# Patient Record
Sex: FEMALE | Race: WHITE | Employment: OTHER | URBAN - METROPOLITAN AREA
[De-identification: names, ages, dates, MRNs, and addresses within clinical notes are randomized per-mention and may not be internally consistent; named-entity substitution may affect disease eponyms.]

---

## 2017-06-16 ENCOUNTER — HOSPITAL ENCOUNTER (OUTPATIENT)
Dept: ULTRASOUND IMAGING | Age: 57
Discharge: HOME | End: 2017-06-16
Attending: INTERNAL MEDICINE
Payer: COMMERCIAL

## 2017-06-16 DIAGNOSIS — E04.9 GOITER: ICD-10-CM

## 2017-06-16 PROCEDURE — 76536 US EXAM OF HEAD AND NECK: CPT

## 2017-11-28 ENCOUNTER — HOSPITAL ENCOUNTER (OUTPATIENT)
Dept: GENERAL RADIOLOGY | Age: 57
Discharge: HOME | End: 2017-11-28
Attending: PHYSICIAN ASSISTANT
Payer: COMMERCIAL

## 2017-11-28 DIAGNOSIS — R13.10 PROBLEMS WITH SWALLOWING AND MASTICATION: ICD-10-CM

## 2017-11-28 PROCEDURE — 74246 X-RAY XM UPR GI TRC 2CNTRST: CPT

## 2017-11-28 PROCEDURE — 74011000250 HC RX REV CODE- 250

## 2017-11-28 PROCEDURE — 74011000255 HC RX REV CODE- 255

## 2017-11-28 RX ADMIN — BARIUM SULFATE 135 ML: 980 POWDER, FOR SUSPENSION ORAL at 11:00

## 2017-11-28 RX ADMIN — ANTACID/ANTIFLATULENT 4 G: 380; 550; 10; 10 GRANULE, EFFERVESCENT ORAL at 11:00

## 2017-11-28 RX ADMIN — BARIUM SULFATE 176 G: 960 POWDER, FOR SUSPENSION ORAL at 11:00

## 2018-01-02 ENCOUNTER — HOSPITAL ENCOUNTER (OUTPATIENT)
Dept: MAMMOGRAPHY | Age: 58
Discharge: HOME | End: 2018-01-02
Attending: OBSTETRICS & GYNECOLOGY
Payer: COMMERCIAL

## 2018-01-02 DIAGNOSIS — Z12.31 VISIT FOR SCREENING MAMMOGRAM: ICD-10-CM

## 2018-01-02 PROCEDURE — 77063 BREAST TOMOSYNTHESIS BI: CPT

## 2018-02-07 ENCOUNTER — HOSPITAL ENCOUNTER (OUTPATIENT)
Dept: LAB | Age: 58
Discharge: HOME | End: 2018-02-07
Payer: OTHER GOVERNMENT

## 2018-02-07 LAB
BACTERIA SPEC CULT: NORMAL
SERVICE CMNT-IMP: NORMAL

## 2018-02-07 PROCEDURE — 87186 SC STD MICRODIL/AGAR DIL: CPT | Performed by: PHYSICIAN ASSISTANT

## 2018-02-07 PROCEDURE — 87046 STOOL CULTR AEROBIC BACT EA: CPT | Performed by: PHYSICIAN ASSISTANT

## 2018-02-07 PROCEDURE — 87177 OVA AND PARASITES SMEARS: CPT | Performed by: PHYSICIAN ASSISTANT

## 2018-02-07 PROCEDURE — 87493 C DIFF AMPLIFIED PROBE: CPT | Performed by: PHYSICIAN ASSISTANT

## 2018-02-09 LAB
G LAMBLIA AG STL QL IA: NEGATIVE
O+P STL CONC: NORMAL
SPECIMEN SOURCE: NORMAL

## 2018-02-10 LAB
BACTERIA SPEC CULT: NORMAL
SERVICE CMNT-IMP: NORMAL

## 2018-02-11 LAB
O&P RESULT 1, 080877: NORMAL
SOURCE, RSRC56: NORMAL

## 2018-10-01 ENCOUNTER — OFFICE VISIT (OUTPATIENT)
Dept: NEUROLOGY | Age: 58
End: 2018-10-01

## 2018-10-01 VITALS
TEMPERATURE: 96.7 F | SYSTOLIC BLOOD PRESSURE: 120 MMHG | DIASTOLIC BLOOD PRESSURE: 70 MMHG | RESPIRATION RATE: 16 BRPM | WEIGHT: 255.38 LBS | BODY MASS INDEX: 45.25 KG/M2 | HEART RATE: 79 BPM | HEIGHT: 63 IN

## 2018-10-01 DIAGNOSIS — M48.061 SPINAL STENOSIS OF LUMBAR REGION WITHOUT NEUROGENIC CLAUDICATION: ICD-10-CM

## 2018-10-01 DIAGNOSIS — M48.02 CERVICAL STENOSIS OF SPINE: Primary | ICD-10-CM

## 2018-10-01 PROBLEM — E66.01 OBESITY, MORBID (HCC): Status: ACTIVE | Noted: 2018-10-01

## 2018-10-01 RX ORDER — CHOLECALCIFEROL (VITAMIN D3) 125 MCG
CAPSULE ORAL
COMMUNITY

## 2018-10-01 NOTE — PROGRESS NOTES
[address], [address], [place], [address]  [facility] 177. Simone Segura, [address]  Office:  [phone]  Fax: [phone]    Referring: Dr. Burgess Lake    Chief Complaint   Patient presents with    Neck Pain     spinal stenosis       HPI:  Katerine Flynn is a 62 year female referred for spinal stenosis. She has a history of cervical spinal stenosis and had been under the care of neurosurgeon Dr. Isar Benton. She has seen him last a year or two ago. She has had right knee replacement at [facility] two years ago. She has been having increased posterior neck pain. There is an associated jarring tap-like sensation that radiates to her head that she has been experiencing for about two weeks. No other radiating pain, hand weakness, gait abnormalities, or loss of bowel or bladder control. Reports carpal tunnel in left arm- sees Dr. Jeni Kemp hand specialist. Had burning pain left hand, had steroid injection. Drops things in left hand she believes when arthritis is bothering her. She had last c-spine MRI in 2015 which report was reviewed in [facility], also MRI lumbar spine last done at 2014. Reports she had MRI c-spine at [facility] in 2016 or so but cannot see in Care Everywhere. There is an EMG scanned into the system from May 2014 that indicates a mild left ulnar neuropathy at the elbow and an underlying axonal sensory polyneuropathy (absent sural responses main finding of polyneuropathy). Does not use assistive device. Recovering from right knee replacement. Lower back being more of a problem within the past year. She reports having a recent sinus infection and bronchitis and being placed on antibiotics. She drove a school bus for 21 years.      Social History     Social History    Marital status:      Spouse name: N/A    Number of children: N/A    Years of education: N/A     Occupational History          Social History Main Topics    Smoking status: Former Smoker     Packs/day: 1.00    Smokeless tobacco: Never Used      Comment: quit 8/2016    Alcohol use No    Drug use: No    Sexual activity: Not on file     Other Topics Concern    Not on file     Social History Narrative       Family History   Problem Relation Age of Onset    Cancer Father      lung    Heart Disease Father     Asthma Mother     Osteoporosis Mother     Breast Cancer Paternal Grandmother        Current Outpatient Prescriptions   Medication Sig Dispense Refill    cyclobenzaprine HCl (FLEXERIL PO) Take 7.5 mg by mouth nightly as needed.  naproxen sodium (ALEVE) 220 mg cap Take  by mouth.  raNITIdine (ZANTAC) 150 mg tablet Take 150 mg by mouth two (2) times a day.  gabapentin (NEURONTIN) 300 mg capsule Take 600 mg by mouth nightly.  aspirin delayed-release 81 mg tablet Take 81 mg by mouth daily.  triamcinolone (NASACORT AQ) 55 mcg nasal inhaler 2 Sprays by Both Nostrils route daily. 1 Bottle 1    triamterene-hydrochlorothiazide (DYAZIDE) 37.5-25 mg per capsule Take 1 Cap by mouth daily as needed. 45 Cap 1    ergocalciferol (ERGOCALCIFEROL) 50,000 unit capsule Take 1 Cap by mouth daily. 90 Cap 3    cyanocobalamin (VITAMIN B-12) 1,000 mcg tablet Take 1,000 mcg by mouth two (2) times a week.          Past Medical History:   Diagnosis Date    Allergic rhinitis     Anxiety     situational difficulty    Back pain     Bursitis of shoulder, left     CAD (coronary artery disease) 03/22/2017    2 stents placed    DJD (degenerative joint disease)     Fibromyalgia     GERD (gastroesophageal reflux disease)     Headache(784.0)     related to DDD of the neck    Hypercholesterolemia     Nausea & vomiting     Obesity     Osteomalacia     Overactive bladder     PN (peripheral neuropathy)     Prediabetes     Seizures ([facility] [address]     one time episode 12/2016 (unknown reason why, no medications, no recurrence)    Shoulder pain, left Past Surgical History:   Procedure Laterality Date    HX CORONARY STENT PLACEMENT  03/22/2017    2 stents placed    HX HEENT      tonsillectomy    HX HYSTERECTOMY      HX KNEE REPLACEMENT Right 08/2016    HX OOPHORECTOMY         Allergies   Allergen Reactions    Augmentin [Amoxicillin-Pot Clavulanate] Diarrhea    Codeine Nausea and Vomiting    Pcn [Penicillins] Hives    Pravastatin Myalgia    Shellfish Derived Itching    Sulfa (Sulfonamide Antibiotics) Hives    Zocor [Simvastatin] Myalgia       Patient Active Problem List   Diagnosis Code    Fibromyalgia M79.7    Obesity E66.9    PN (peripheral neuropathy) G62.9    Overactive bladder N32.81    Blood glucose elevated R73.9    Chronic venous insufficiency I87.2    Tobacco abuse Z72.0    Hyperlipidemia E78.5    Unspecified vitamin D deficiency E55.9    Hand pain, left M79.642    Bronchitis J40    Lumbar spondylosis M47.816    Other B-complex deficiencies E53.8    Obesity, morbid (HCC) E66.01         Review of Systems:   Constitutional: no fever or chills  Skin denies rash or itching  HEENT:  Denies tinnitus, hearing loss, or visual changes  Respiratory: denies shortness of breath  Cardiovascular: denies chest pain, dyspnea on exertion  Gastrointestinal: does not report nausea or vomiting  Genitourinary: does not report dysuria or incontinence  Musculoskeletal: right knee joint pain and swelling. Neck pain, lower back pain. Endocrine: denies weight change  Hematology: denies easy bruising or bleeding   Neurological: as above in HPI      PHYSICAL EXAMINATION:      VITAL SIGNS:    Visit Vitals    /70 (BP 1 Location: Right arm, BP Patient Position: Sitting)    Pulse 79    Temp 96.7 °F (35.9 °C) (Oral)    Resp 16    Ht 5' 3\" (1.6 m)    Wt 115.8 kg (255 lb 6 oz)    BMI 45.24 kg/m2       GENERAL: Well developed, well nourished, in no apparent distress. HEART: RR, no murmurs heard, no carotid bruits.   LUNGS: CTAB.  EXTREMITIES: No clubbing, cyanosis, or edema is identified. Pulses 2+    and symmetrical.  HEAD:   Normocephalic, atraumatic. NEUROLOGIC EXAMINATION    MENTAL STATUS: Awake, alert, and oriented x 4. Attention and STM are grossly normal. There is no aphasia. Fund of knowledge is adequate. Mood and affect are appropriate. CRANIAL NERVES: Visual fields are full to confrontation. Pupils are reactive to light and accommodation. Extraocular movements are intact and there is no nystagmus. Facial sensation is normal.  Face is symmetrical.   Hearing is grossly intact. SCM/TPZ 5/5. Palate rises symmetrically. Tongue is in the midline. MOTOR:  Normal tone, bulk, and strength, 5/5 muscle strength throughout. No cogwheel rigidity or clonus present. CEREBELLAR: Finger to nose was normal.   No tremors or dysmetria. SENSORY: Normal LT    DTRs: Increased LUE compared to RUE. Diminished LE. GAIT:   Normal gait. Impression/Plan:   Jose Angel Villela is a 62 y.o. female with cervical spinal stenosis. We will obtain imaging of her cervical spine and lumbar spine with MRI to assess for changes in spinal stenosis due her recent increase in symptoms and follow up with her thereafter. In the meantime she continues on her current regimen of naproxen, Flexeril, and gabapentin. Diagnoses and all orders for this visit:    1. Cervical stenosis of spine  -     MRI CERV SPINE WO CONT; Future  -     MRI LUMB SPINE WO CONT; Future    2. Spinal stenosis of lumbar region without neurogenic claudication  -     MRI CERV SPINE WO CONT; Future  -     MRI LUMB SPINE WO CONT; Future      This note will not be viewable in MyChart. Signed By: Tony Mace NP      I have reviewed the documentation provided by the nurse practitioner, Nani Penny Kowalski, and we have discussed her findings and the clinical impression. I have formulated with her the proposed management plans for this patient.   Additionally, I have personally evaluated the patient to verify the history and to confirm physical findings. Below are my additional comments:  59-year-old lady with known cervical stenosis and lumbar stenosis with increasing pain about the neck and some burning dysesthesia in the left upper extremity it was previously attributed as carpal tunnel. She has not had any recent imaging. She has not had any recent fall. Not dropping things. She does note that she is having increasing pain in the low back as well. She walks for distances her legs seem to get weak. No fall. No focal dragging of the leg. Her examination today is completely nonfocal with a normal general physical examination as well. No pathologic reflexes today. At this juncture given her known disease and increase in symptoms we will update imaging with an MRI of the cervical and lumbar spine without contrast looking for evidence of increased disease progression. She will return at the conclusion of her testing and at that point depending on the anatomy we may refer her back to neurosurgery versus physical therapy versus seeing Dr. Ben Canales for injections. Given the fact that her examination is intact I would favor the physical therapy and injection route but again let see what the pictures show us. In any regard we would not institute any treatment plan until we have updated anatomy. Deni Rios MD   This note will not be viewable in [address] PLEASE NOTE:   Portions of this document may have been produced using voice recognition software. Unrecognized errors in transcription may be present.

## 2018-10-01 NOTE — PROGRESS NOTES
Patient is here today to see  for their h/o spinal stenosis.     PHQ over the last two weeks 10/1/2018   Little interest or pleasure in doing things Several days   Feeling down, depressed, irritable, or hopeless Several days   Total Score PHQ 2 2

## 2018-10-01 NOTE — MR AVS SNAPSHOT
[address] [address] [address] [facility] 
[phone] Patient: Leni Hilario MRN: [id number] EDGAR:[id number] Visit Information Date & Time Provider Department Dept. Phone Encounter #  
 10/1/2018  3:00 PM Carl Michelle MD [facility] at [address] [place] [address] [id number] Follow-up Instructions Return for After tests NP. Upcoming Health Maintenance Date Due Hepatitis C Screening 1960 Shingrix Vaccine Age 50> (1 of 2) 1/22/2010 FOBT Q 1 YEAR AGE 50-75 1/22/2010 PAP AKA CERVICAL CYTOLOGY 9/11/2016 Influenza Age 5 to Adult 8/1/2018 BREAST CANCER SCRN MAMMOGRAM 1/2/2020 DTaP/Tdap/Td series (2 - Td) 10/1/2021 Allergies as of 10/1/2018  Review Complete On: 10/1/2018 By: Ashley Sauceda NP Severity Noted Reaction Type Reactions Augmentin [Amoxicillin-pot Clavulanate]  08/10/2011    Diarrhea Codeine    Nausea and Vomiting Pcn [Penicillins]  12/02/2016    Hives Pravastatin  11/11/2011    Myalgia Shellfish Derived  08/03/2017    Itching Sulfa (Sulfonamide Antibiotics)  12/02/2016    Hives Zocor [Simvastatin]   Intolerance Myalgia Current Immunizations  Reviewed on 12/23/2013 Name Date Influenza Vaccine PF 12/23/2013 TD Vaccine 1/1/2005 Tdap 10/1/2011 Zoster Vaccine, Live 3/27/2014 Not reviewed this visit You Were Diagnosed With   
  
 Codes Comments Cervical stenosis of spine    -  Primary ICD-10-CM: M48.02 
ICD-9-CM: 723.0 Spinal stenosis of lumbar region without neurogenic claudication     ICD-10-CM: M48.061 
ICD-9-CM: 724.02 Vitals BP Pulse Temp Resp Height(growth percentile) Weight(growth percentile) 120/70 (BP 1 Location: Right arm, BP Patient Position: Sitting) 79 96.7 °F (35.9 °C) (Oral) 16 5' 3\" (1.6 m) 255 lb 6 oz (115.8 kg) BMI OB Status Smoking Status 45.24 kg/m2 Hysterectomy Former Smoker Vitals History BMI and BSA Data Body Mass Index Body Surface Area  
 45.24 kg/m 2 2.27 m 2 Your Updated Medication List  
  
   
This list is accurate as of 10/1/18  4:47 PM.  Always use your most recent med list.  
  
  
  
  
 ALEVE 220 mg Cap Generic drug:  naproxen sodium Take  by mouth. aspirin delayed-release 81 mg tablet Take 81 mg by mouth daily. ergocalciferol 50,000 unit capsule Commonly known as:  ERGOCALCIFEROL Take 1 Cap by mouth daily. FLEXERIL PO Take 7.5 mg by mouth nightly as needed. gabapentin 300 mg capsule Commonly known as:  NEURONTIN Take 600 mg by mouth nightly. triamcinolone 55 mcg nasal inhaler Commonly known as:  NASACORT AQ  
2 Sprays by Both Nostrils route daily. triamterene-hydroCHLOROthiazide 37.5-25 mg per capsule Commonly known as:  Mendoza Guerrero Take 1 Cap by mouth daily as needed. VITAMIN B-12 1,000 mcg tablet Generic drug:  cyanocobalamin Take 1,000 mcg by mouth two (2) times a week. ZANTAC 150 mg tablet Generic drug:  raNITIdine Take 150 mg by mouth two (2) times a day. Follow-up Instructions Return for After tests NP. To-Do List   
 10/01/2018 Imaging:  MRI CERV SPINE WO CONT   
  
 10/01/2018 Imaging:  MRI LUMB SPINE WO CONT Introducing [facility]! [facility] introduces [employer] patient portal. Now you can access parts of your medical record, email your doctor's office, and request medication refills online. 1. In your internet browser, go to [email or web address] 2. Click on the First Time User? Click Here link in the Sign In box. You will see the New Member Sign Up page. 3. Enter your [employer] Access Code exactly as it appears below. You will not need to use this code after youve completed the sign-up process.  If you do not sign up before the expiration date, you must request a new code. · [email or web address] Access Code: [email or web address] Expires: 12/30/2018  3:00 PM 
 
4. Enter the last four digits of your Social Security Number (xxxx) and Date of Birth (mm/dd/yyyy) as indicated and click Submit. You will be taken to the next sign-up page. 5. Create a [employer] ID. This will be your [employer] login ID and cannot be changed, so think of one that is secure and easy to remember. 6. Create a [employer] password. You can change your password at any time. 7. Enter your Password Reset Question and Answer. This can be used at a later time if you forget your password. 8. Enter your e-mail address. You will receive e-mail notification when new information is available in [address] 9. Click Sign Up. You can now view and download portions of your medical record. 10. Click the Download Summary menu link to download a portable copy of your medical information. If you have questions, please visit the Frequently Asked Questions section of the [employer] website. Remember, [employer] is NOT to be used for urgent needs. For medical emergencies, dial 911. Now available from your iPhone and Android! Please provide this summary of care documentation to your next provider. Your primary care clinician is listed as [facility]. If you have any questions after today's visit, please call [phone].

## 2018-10-11 ENCOUNTER — HOSPITAL ENCOUNTER (OUTPATIENT)
Age: 58
Discharge: HOME | End: 2018-10-11
Payer: OTHER GOVERNMENT

## 2018-10-11 DIAGNOSIS — M48.061 SPINAL STENOSIS OF LUMBAR REGION WITHOUT NEUROGENIC CLAUDICATION: ICD-10-CM

## 2018-10-11 DIAGNOSIS — M48.02 CERVICAL STENOSIS OF SPINE: ICD-10-CM

## 2018-10-11 PROCEDURE — 72148 MRI LUMBAR SPINE W/O DYE: CPT

## 2018-10-11 PROCEDURE — 72141 MRI NECK SPINE W/O DYE: CPT

## 2018-10-15 ENCOUNTER — OFFICE VISIT (OUTPATIENT)
Dept: NEUROLOGY | Age: 58
End: 2018-10-15

## 2018-10-15 VITALS
HEIGHT: 63 IN | RESPIRATION RATE: 18 BRPM | TEMPERATURE: 97 F | SYSTOLIC BLOOD PRESSURE: 110 MMHG | DIASTOLIC BLOOD PRESSURE: 70 MMHG | WEIGHT: 256 LBS | HEART RATE: 78 BPM | BODY MASS INDEX: 45.36 KG/M2 | OXYGEN SATURATION: 98 %

## 2018-10-15 DIAGNOSIS — M48.02 CERVICAL STENOSIS OF SPINAL CANAL: Primary | ICD-10-CM

## 2018-10-15 NOTE — PROGRESS NOTES
Chief Complaint   Patient presents with    Back Pain     Follow up MRI     PHQ over the last two weeks 10/15/2018   Little interest or pleasure in doing things Not at all   Feeling down, depressed, irritable, or hopeless Not at all   Total Score PHQ 2 0

## 2018-10-15 NOTE — PROGRESS NOTES
[address], [address], [place], [address]  [address] Simone eSgura, [address]  Office:  [phone]  Fax: [phone]  Chief Complaint   Patient presents with    Back Pain     Follow up MRI     This is a 59-year-old female who presents for follow-up back pain. Pain to the neck. In the interim had her MRI is completed. Here for results. No new complaints at this time. Denies numbness, tingling, or passing out. Denies weakness. Denies dropping things. In the past she was seen by neurosurgeon Dr. Natalie Mosquera. She says due to insurance is unable to follow up with him. Past Medical History:   Diagnosis Date    Allergic rhinitis     Anxiety     situational difficulty    Back pain     Bursitis of shoulder, left     CAD (coronary artery disease) 03/22/2017    2 stents placed    DJD (degenerative joint disease)     Fibromyalgia     GERD (gastroesophageal reflux disease)     Headache(784.0)     related to DDD of the neck    Hypercholesterolemia     Nausea & vomiting     Obesity     Osteomalacia     Overactive bladder     PN (peripheral neuropathy)     Prediabetes     Seizures ([facility] [address]     one time episode 12/2016 (unknown reason why, no medications, no recurrence)    Shoulder pain, left        Past Surgical History:   Procedure Laterality Date    HX CORONARY STENT PLACEMENT  03/22/2017    2 stents placed    HX HEENT      tonsillectomy    HX HYSTERECTOMY      HX KNEE REPLACEMENT Right 08/2016    HX OOPHORECTOMY         Current Outpatient Prescriptions   Medication Sig Dispense Refill    cyclobenzaprine HCl (FLEXERIL PO) Take 7.5 mg by mouth nightly as needed.  naproxen sodium (ALEVE) 220 mg cap Take  by mouth.  raNITIdine (ZANTAC) 150 mg tablet Take 150 mg by mouth two (2) times a day.  gabapentin (NEURONTIN) 300 mg capsule Take 600 mg by mouth nightly.  aspirin delayed-release 81 mg tablet Take 81 mg by mouth daily.       triamcinolone (NASACORT AQ) 55 mcg nasal inhaler 2 Sprays by Both Nostrils route daily. 1 Bottle 1    triamterene-hydrochlorothiazide (DYAZIDE) 37.5-25 mg per capsule Take 1 Cap by mouth daily as needed. 45 Cap 1    ergocalciferol (ERGOCALCIFEROL) 50,000 unit capsule Take 1 Cap by mouth daily. 90 Cap 3    cyanocobalamin (VITAMIN B-12) 1,000 mcg tablet Take 1,000 mcg by mouth two (2) times a week. Allergies   Allergen Reactions    Augmentin [Amoxicillin-Pot Clavulanate] Diarrhea    Codeine Nausea and Vomiting    Pcn [Penicillins] Hives    Pravastatin Myalgia    Shellfish Derived Itching    Sulfa (Sulfonamide Antibiotics) Hives    Zocor [Simvastatin] Myalgia       Social History   Substance Use Topics    Smoking status: Former Smoker     Packs/day: 1.00    Smokeless tobacco: Never Used      Comment: quit 8/2016    Alcohol use No       Family History   Problem Relation Age of Onset    Cancer Father      lung    Heart Disease Father     Asthma Mother     Osteoporosis Mother     Breast Cancer Paternal Grandmother        Review of Systems  GENERAL: Denies fever or fatigue  CARDIAC: No CP or SOB  PULMONARY: No cough of SOB  MUSCULOSKELETAL: No new joint pain  NEURO: SEE HPI    Examination  Visit Vitals    /70 (BP 1 Location: Left arm, BP Patient Position: Sitting)    Pulse 78    Temp 97 °F (36.1 °C)    Resp 18    Ht 5' 3\" (1.6 m)    Wt 116.1 kg (256 lb)    SpO2 98%    BMI 45.35 kg/m2     This is a very pleasant 80-year-old female. She is alert and oriented x3. Her speech is clear. No abnormal movements. No signs of ataxia or incoordination. She ambulates steady. Impression/Plan  Alexx Amaro is a 62 y.o. female whose history and physical are consistent with cervical spinal stenosis. Patient presents for follow-up results.   We discussed diagnostic test results including MRI of the cervical spine with findings of interval progression of multilevel moderately advanced degenerative changes in the lower cervical spine. Severe central canal stenosis C4/5 and C5/6 and moderate to severe stenosis at C6/7 with associated cord compression. No myelopathy. Multilevel mild or moderate degrees of foraminal stenosis at C5/6 and C6/7. Changes noted from previous MRI of the C spine from 6/2015. At this juncture referral placed to neurosurgeon for further evaluation. We discussed MRI of the lumbar spine with degenerative findings with interval progression of multilevel listhesis and degenerative changes throughout the lumbar spine. No high grade stenosis found. We discussed following up on an as needed basis. All questions addressed and patient is agreeable with plan of care. Diagnoses and all orders for this visit:    1. Cervical stenosis of spinal canal  -     REFERRAL TO NEUROSURGERY    Total time: 25 min   Counseling / coordination time: 17 min   > 50% counseling / coordination?: Yes re: symptoms, management, diagnostic test results, coordination, answering questions, and plan of care. Signed By: Gurpreet Meier NP    This note will not be viewable in [address] PLEASE NOTE:   Portions of this document may have been produced using voice recognition software. Unrecognized errors in transcription may be present.

## 2018-10-15 NOTE — PATIENT INSTRUCTIONS
Thank you for choosing [employer] and [facility] for your     care. You may receive a survey about your visit. We appreciate you taking time     to complete this survey as we use your feedback to improve our services. We     realize we are not perfect, but we strive to provide excellent care.

## 2018-10-15 NOTE — MR AVS SNAPSHOT
[address] [address] [address] [facility] 
[phone] Patient: Herman Page MRN: [id number] [facility]:[birth year] Visit Information Date & Time Provider Department Dept. Phone Encounter #  
 10/15/2018 10:45 AM Carmelo Ellison NP [address] at [address] [place] [address] [id number] Follow-up Instructions Return if symptoms worsen or fail to improve. Upcoming Health Maintenance Date Due Hepatitis C Screening 1960 Shingrix Vaccine Age 50> (1 of 2) 1/22/2010 FOBT Q 1 YEAR AGE 50-75 1/22/2010 PAP AKA CERVICAL CYTOLOGY 9/11/2016 Influenza Age 5 to Adult 8/1/2018 BREAST CANCER SCRN MAMMOGRAM 1/2/2020 DTaP/Tdap/Td series (2 - Td) 10/1/2021 Allergies as of 10/15/2018  Review Complete On: 10/15/2018 By: Edi Garcia LPN Severity Noted Reaction Type Reactions Augmentin [Amoxicillin-pot Clavulanate]  08/10/2011    Diarrhea Codeine    Nausea and Vomiting Pcn [Penicillins]  12/02/2016    Hives Pravastatin  11/11/2011    Myalgia Shellfish Derived  08/03/2017    Itching Sulfa (Sulfonamide Antibiotics)  12/02/2016    Hives Zocor [Simvastatin]   Intolerance Myalgia Current Immunizations  Reviewed on 12/23/2013 Name Date Influenza Vaccine PF 12/23/2013 TD Vaccine 1/1/2005 Tdap 10/1/2011 Zoster Vaccine, Live 3/27/2014 Not reviewed this visit You Were Diagnosed With   
  
 Codes Comments Cervical stenosis of spinal canal    -  Primary ICD-10-CM: M48.02 
ICD-9-CM: 723.0 Vitals BP Pulse Temp Resp Height(growth percentile) Weight(growth percentile) 110/70 (BP 1 Location: Left arm, BP Patient Position: Sitting) 78 97 °F (36.1 °C) 18 5' 3\" (1.6 m) 256 lb (116.1 kg) SpO2 BMI OB Status Smoking Status 98% 45.35 kg/m2 Hysterectomy Former Smoker BMI and BSA Data Body Mass Index Body Surface Area 45.35 kg/m 2 2.27 m 2 Your Updated Medication List  
  
   
This list is accurate as of 10/15/18 11:34 AM.  Always use your most recent med list.  
  
  
  
  
 ALEVE 220 mg Cap Generic drug:  naproxen sodium Take  by mouth. aspirin delayed-release 81 mg tablet Take 81 mg by mouth daily. ergocalciferol 50,000 unit capsule Commonly known as:  ERGOCALCIFEROL Take 1 Cap by mouth daily. FLEXERIL PO Take 7.5 mg by mouth nightly as needed. gabapentin 300 mg capsule Commonly known as:  NEURONTIN Take 600 mg by mouth nightly. triamcinolone 55 mcg nasal inhaler Commonly known as:  NASACORT AQ  
2 Sprays by Both Nostrils route daily. triamterene-hydroCHLOROthiazide 37.5-25 mg per capsule Commonly known as:  Jacy Bock Take 1 Cap by mouth daily as needed. VITAMIN B-12 1,000 mcg tablet Generic drug:  cyanocobalamin Take 1,000 mcg by mouth two (2) times a week. ZANTAC 150 mg tablet Generic drug:  raNITIdine Take 150 mg by mouth two (2) times a day. We Performed the Following REFERRAL TO NEUROSURGERY [EJL85 Custom] Comments:  
 Please evaluate for cervical stenosis. Follow-up Instructions Return if symptoms worsen or fail to improve. Referral Information Referral ID Referred By Referred To  
  
 [id number] Herminia MEEHAN Not Available Visits Status Start Date End Date 1 New Request 10/15/18 10/15/19 If your referral has a status of pending review or denied, additional information will be sent to support the outcome of this decision. Patient Instructions Thank you for choosing [facility] and [facility] for your  
 
care. You may receive a survey about your visit. We appreciate you taking time  
 
to complete this survey as we use your feedback to improve our services. We  
 
realize we are not perfect, but we strive to provide excellent care. Introducing [facility]! [facility] introduces [employer] patient portal. Now you can access parts of your medical record, email your doctor's office, and request medication refills online. 1. In your internet browser, go to [email or web address] 2. Click on the First Time User? Click Here link in the Sign In box. You will see the New Member Sign Up page. 3. Enter your [employer] Access Code exactly as it appears below. You will not need to use this code after youve completed the sign-up process. If you do not sign up before the expiration date, you must request a new code. · [employer] Access Code: [email or web address] Expires: 12/30/2018  3:00 PM 
 
4. Enter the last four digits of your Social Security Number (xxxx) and Date of Birth (mm/dd/yyyy) as indicated and click Submit. You will be taken to the next sign-up page. 5. Create a [employer] ID. This will be your [employer] login ID and cannot be changed, so think of one that is secure and easy to remember. 6. Create a [employer] password. You can change your password at any time. 7. Enter your Password Reset Question and Answer. This can be used at a later time if you forget your password. 8. Enter your e-mail address. You will receive e-mail notification when new information is available in [address] 9. Click Sign Up. You can now view and download portions of your medical record. 10. Click the Download Summary menu link to download a portable copy of your medical information. If you have questions, please visit the Frequently Asked Questions section of the [employer] website. Remember, [employer] is NOT to be used for urgent needs. For medical emergencies, dial 911. Now available from your iPhone and Android! Please provide this summary of care documentation to your next provider. Your primary care clinician is listed as [facility]. If you have any questions after today's visit, please call [phone].

## 2019-05-16 ENCOUNTER — HOSPITAL ENCOUNTER (OUTPATIENT)
Dept: MAMMOGRAPHY | Age: 59
Discharge: HOME | End: 2019-05-16
Attending: OBSTETRICS & GYNECOLOGY
Payer: COMMERCIAL

## 2019-05-16 DIAGNOSIS — Z12.31 VISIT FOR SCREENING MAMMOGRAM: ICD-10-CM

## 2019-05-16 PROCEDURE — 77063 BREAST TOMOSYNTHESIS BI: CPT

## 2020-12-31 ENCOUNTER — TRANSCRIBE ORDER (OUTPATIENT)
Dept: SCHEDULING | Age: 60
End: 2020-12-31

## 2020-12-31 DIAGNOSIS — N63.0 LUMP OR MASS IN BREAST: Primary | ICD-10-CM
